# Patient Record
Sex: FEMALE | Race: OTHER | URBAN - METROPOLITAN AREA
[De-identification: names, ages, dates, MRNs, and addresses within clinical notes are randomized per-mention and may not be internally consistent; named-entity substitution may affect disease eponyms.]

---

## 2017-02-01 ENCOUNTER — EMERGENCY (EMERGENCY)
Facility: HOSPITAL | Age: 27
LOS: 1 days | Discharge: HOME | End: 2017-02-01
Attending: EMERGENCY MEDICINE
Payer: COMMERCIAL

## 2017-02-01 VITALS
TEMPERATURE: 98 F | SYSTOLIC BLOOD PRESSURE: 120 MMHG | DIASTOLIC BLOOD PRESSURE: 81 MMHG | HEIGHT: 62 IN | OXYGEN SATURATION: 99 % | WEIGHT: 138.89 LBS | RESPIRATION RATE: 18 BRPM | HEART RATE: 98 BPM

## 2017-02-01 DIAGNOSIS — S09.90XA UNSPECIFIED INJURY OF HEAD, INITIAL ENCOUNTER: ICD-10-CM
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
## 2017-02-01 PROCEDURE — 70450 CT HEAD/BRAIN W/O DYE: CPT | Mod: 26

## 2017-02-01 PROCEDURE — 99284 EMERGENCY DEPT VISIT MOD MDM: CPT

## 2017-02-01 PROCEDURE — 70450 CT HEAD/BRAIN W/O DYE: CPT

## 2017-02-01 NOTE — ED PROVIDER NOTE - OBJECTIVE STATEMENT
26 y/o F pt w/ no significant PMHx presents to the ED c/o headache. Pt states that 2 days ago she was accidentally elbowed in the forehead. Since has been feeling headache, nausea, lightheadedness and difficulty concentrating. Pt called her PMD who told her to come to the ED. Denies LOC, vomiting, difficulty walking or any other complaints. NKDA.

## 2017-02-01 NOTE — ED PROVIDER NOTE - MEDICAL DECISION MAKING DETAILS
Pt w/ nausea headache and dizziness after head injury. Normal CT head. Likely concussion. Pt to follow up w/ her PMD in 1-2 days and neurologist in 1 week. Pt w/ nausea, headache, and dizziness after head injury. Normal CT head. Likely concussion. Pt to follow up w/ her PMD in 1-2 days and neurologist in 1 week.

## 2017-06-17 ENCOUNTER — EMERGENCY (EMERGENCY)
Facility: HOSPITAL | Age: 27
LOS: 1 days | Discharge: HOME | End: 2017-06-17
Attending: EMERGENCY MEDICINE
Payer: COMMERCIAL

## 2017-06-17 VITALS
DIASTOLIC BLOOD PRESSURE: 73 MMHG | HEART RATE: 97 BPM | OXYGEN SATURATION: 100 % | TEMPERATURE: 98 F | WEIGHT: 138.01 LBS | SYSTOLIC BLOOD PRESSURE: 128 MMHG | RESPIRATION RATE: 20 BRPM | HEIGHT: 62 IN

## 2017-06-17 DIAGNOSIS — N30.90 CYSTITIS, UNSPECIFIED WITHOUT HEMATURIA: ICD-10-CM

## 2017-06-17 DIAGNOSIS — G43.909 MIGRAINE, UNSPECIFIED, NOT INTRACTABLE, WITHOUT STATUS MIGRAINOSUS: ICD-10-CM

## 2017-06-17 DIAGNOSIS — E03.9 HYPOTHYROIDISM, UNSPECIFIED: ICD-10-CM

## 2017-06-17 LAB
ANION GAP SERPL CALC-SCNC: 9 MMOL/L — SIGNIFICANT CHANGE UP (ref 5–17)
APPEARANCE UR: CLEAR — SIGNIFICANT CHANGE UP
BASOPHILS # BLD AUTO: 0.1 K/UL — SIGNIFICANT CHANGE UP (ref 0–0.2)
BASOPHILS NFR BLD AUTO: 1 % — SIGNIFICANT CHANGE UP (ref 0–2)
BILIRUB UR-MCNC: NEGATIVE — SIGNIFICANT CHANGE UP
BUN SERPL-MCNC: 8 MG/DL — SIGNIFICANT CHANGE UP (ref 7–18)
CALCIUM SERPL-MCNC: 8.4 MG/DL — SIGNIFICANT CHANGE UP (ref 8.4–10.5)
CHLORIDE SERPL-SCNC: 103 MMOL/L — SIGNIFICANT CHANGE UP (ref 96–108)
CO2 SERPL-SCNC: 26 MMOL/L — SIGNIFICANT CHANGE UP (ref 22–31)
COLOR SPEC: YELLOW — SIGNIFICANT CHANGE UP
CREAT SERPL-MCNC: 0.8 MG/DL — SIGNIFICANT CHANGE UP (ref 0.5–1.3)
DIFF PNL FLD: ABNORMAL
EOSINOPHIL # BLD AUTO: 0 K/UL — SIGNIFICANT CHANGE UP (ref 0–0.5)
EOSINOPHIL NFR BLD AUTO: 0.4 % — SIGNIFICANT CHANGE UP (ref 0–6)
GLUCOSE SERPL-MCNC: 129 MG/DL — HIGH (ref 70–99)
GLUCOSE UR QL: NEGATIVE — SIGNIFICANT CHANGE UP
HCG UR QL: NEGATIVE — SIGNIFICANT CHANGE UP
HCT VFR BLD CALC: 42.2 % — SIGNIFICANT CHANGE UP (ref 34.5–45)
HGB BLD-MCNC: 14.1 G/DL — SIGNIFICANT CHANGE UP (ref 11.5–15.5)
KETONES UR-MCNC: ABNORMAL
LEUKOCYTE ESTERASE UR-ACNC: ABNORMAL
LYMPHOCYTES # BLD AUTO: 1.1 K/UL — SIGNIFICANT CHANGE UP (ref 1–3.3)
LYMPHOCYTES # BLD AUTO: 14.8 % — SIGNIFICANT CHANGE UP (ref 13–44)
MCHC RBC-ENTMCNC: 29.7 PG — SIGNIFICANT CHANGE UP (ref 27–34)
MCHC RBC-ENTMCNC: 33.4 GM/DL — SIGNIFICANT CHANGE UP (ref 32–36)
MCV RBC AUTO: 89.1 FL — SIGNIFICANT CHANGE UP (ref 80–100)
MONOCYTES # BLD AUTO: 0.8 K/UL — SIGNIFICANT CHANGE UP (ref 0–0.9)
MONOCYTES NFR BLD AUTO: 11.2 % — SIGNIFICANT CHANGE UP (ref 2–14)
NEUTROPHILS # BLD AUTO: 5.2 K/UL — SIGNIFICANT CHANGE UP (ref 1.8–7.4)
NEUTROPHILS NFR BLD AUTO: 72.6 % — SIGNIFICANT CHANGE UP (ref 43–77)
NITRITE UR-MCNC: NEGATIVE — SIGNIFICANT CHANGE UP
PH UR: 5 — SIGNIFICANT CHANGE UP (ref 5–8)
PLATELET # BLD AUTO: 183 K/UL — SIGNIFICANT CHANGE UP (ref 150–400)
POTASSIUM SERPL-MCNC: 3.3 MMOL/L — LOW (ref 3.5–5.3)
POTASSIUM SERPL-SCNC: 3.3 MMOL/L — LOW (ref 3.5–5.3)
PROT UR-MCNC: NEGATIVE — SIGNIFICANT CHANGE UP
RBC # BLD: 4.73 M/UL — SIGNIFICANT CHANGE UP (ref 3.8–5.2)
RBC # FLD: 12.3 % — SIGNIFICANT CHANGE UP (ref 10.3–14.5)
SODIUM SERPL-SCNC: 138 MMOL/L — SIGNIFICANT CHANGE UP (ref 135–145)
SP GR SPEC: 1.01 — SIGNIFICANT CHANGE UP (ref 1.01–1.02)
UROBILINOGEN FLD QL: NEGATIVE — SIGNIFICANT CHANGE UP
WBC # BLD: 7.2 K/UL — SIGNIFICANT CHANGE UP (ref 3.8–10.5)
WBC # FLD AUTO: 7.2 K/UL — SIGNIFICANT CHANGE UP (ref 3.8–10.5)

## 2017-06-17 PROCEDURE — 81001 URINALYSIS AUTO W/SCOPE: CPT

## 2017-06-17 PROCEDURE — 96374 THER/PROPH/DIAG INJ IV PUSH: CPT

## 2017-06-17 PROCEDURE — 81025 URINE PREGNANCY TEST: CPT

## 2017-06-17 PROCEDURE — 96375 TX/PRO/DX INJ NEW DRUG ADDON: CPT

## 2017-06-17 PROCEDURE — 99284 EMERGENCY DEPT VISIT MOD MDM: CPT | Mod: 25

## 2017-06-17 PROCEDURE — 87086 URINE CULTURE/COLONY COUNT: CPT

## 2017-06-17 PROCEDURE — 99284 EMERGENCY DEPT VISIT MOD MDM: CPT

## 2017-06-17 PROCEDURE — 80048 BASIC METABOLIC PNL TOTAL CA: CPT

## 2017-06-17 PROCEDURE — 85027 COMPLETE CBC AUTOMATED: CPT

## 2017-06-17 RX ORDER — METOCLOPRAMIDE HCL 10 MG
10 TABLET ORAL ONCE
Refills: 0 | Status: COMPLETED | OUTPATIENT
Start: 2017-06-17 | End: 2017-06-17

## 2017-06-17 RX ORDER — DIPHENHYDRAMINE HCL 50 MG
25 CAPSULE ORAL ONCE
Refills: 0 | Status: COMPLETED | OUTPATIENT
Start: 2017-06-17 | End: 2017-06-17

## 2017-06-17 RX ORDER — SODIUM CHLORIDE 9 MG/ML
2000 INJECTION INTRAMUSCULAR; INTRAVENOUS; SUBCUTANEOUS ONCE
Refills: 0 | Status: COMPLETED | OUTPATIENT
Start: 2017-06-17 | End: 2017-06-17

## 2017-06-17 RX ORDER — POTASSIUM CHLORIDE 20 MEQ
40 PACKET (EA) ORAL ONCE
Refills: 0 | Status: COMPLETED | OUTPATIENT
Start: 2017-06-17 | End: 2017-06-17

## 2017-06-17 RX ADMIN — SODIUM CHLORIDE 2000 MILLILITER(S): 9 INJECTION INTRAMUSCULAR; INTRAVENOUS; SUBCUTANEOUS at 18:52

## 2017-06-17 RX ADMIN — Medication 1 TABLET(S): at 19:58

## 2017-06-17 RX ADMIN — Medication 40 MILLIEQUIVALENT(S): at 19:58

## 2017-06-17 RX ADMIN — Medication 25 MILLIGRAM(S): at 18:52

## 2017-06-17 RX ADMIN — Medication 10 MILLIGRAM(S): at 18:52

## 2017-06-17 NOTE — ED PROVIDER NOTE - CHPI ED SYMPTOMS NEG
no neck stiffness, no vomiting, no diarrhea, no numbness, no tingling, no weakness, no visual changes

## 2017-06-17 NOTE — ED PROVIDER NOTE - OBJECTIVE STATEMENT
28 y/o F pt with PMHx of migraines, hypothyroidism, presents to ED c/o migraine x 1 week. Pt describes HA to be a constant bitemporal nagging pain. Pt endorses nausea but denies vomiting. Pt reports having fevers at home (T-max 102). Pt denies vomiting, diarrhea, numbness, tingling, weakness, neck stiffness, visual changes, recent foreign travels, recent sick contacts, or any other complaints. NKDA.

## 2017-06-17 NOTE — ED PROVIDER NOTE - MEDICAL DECISION MAKING DETAILS
28 y/o F with H/O migraines, here for migraines and reported fever at home. Pt is well appearing and with vital signs WNL. Plan for labs, pain meds and reassess. Pt may require LP.

## 2017-06-18 LAB
CULTURE RESULTS: NO GROWTH — SIGNIFICANT CHANGE UP
SPECIMEN SOURCE: SIGNIFICANT CHANGE UP

## 2018-11-14 ENCOUNTER — EMERGENCY (EMERGENCY)
Facility: HOSPITAL | Age: 28
LOS: 1 days | Discharge: HOME | End: 2018-11-14
Attending: EMERGENCY MEDICINE
Payer: COMMERCIAL

## 2018-11-14 VITALS
DIASTOLIC BLOOD PRESSURE: 73 MMHG | WEIGHT: 132.06 LBS | OXYGEN SATURATION: 100 % | TEMPERATURE: 98 F | HEART RATE: 87 BPM | SYSTOLIC BLOOD PRESSURE: 108 MMHG | HEIGHT: 62 IN | RESPIRATION RATE: 16 BRPM

## 2018-11-14 VITALS
OXYGEN SATURATION: 100 % | DIASTOLIC BLOOD PRESSURE: 54 MMHG | TEMPERATURE: 98 F | SYSTOLIC BLOOD PRESSURE: 105 MMHG | HEART RATE: 69 BPM | RESPIRATION RATE: 20 BRPM

## 2018-11-14 PROCEDURE — 70450 CT HEAD/BRAIN W/O DYE: CPT | Mod: 26

## 2018-11-14 PROCEDURE — 96374 THER/PROPH/DIAG INJ IV PUSH: CPT

## 2018-11-14 PROCEDURE — 99284 EMERGENCY DEPT VISIT MOD MDM: CPT | Mod: 25

## 2018-11-14 PROCEDURE — 96375 TX/PRO/DX INJ NEW DRUG ADDON: CPT

## 2018-11-14 PROCEDURE — 99285 EMERGENCY DEPT VISIT HI MDM: CPT

## 2018-11-14 PROCEDURE — 70450 CT HEAD/BRAIN W/O DYE: CPT

## 2018-11-14 RX ORDER — MECLIZINE HCL 12.5 MG
25 TABLET ORAL ONCE
Refills: 0 | Status: COMPLETED | OUTPATIENT
Start: 2018-11-14 | End: 2018-11-14

## 2018-11-14 RX ORDER — SODIUM CHLORIDE 9 MG/ML
1000 INJECTION INTRAMUSCULAR; INTRAVENOUS; SUBCUTANEOUS ONCE
Refills: 0 | Status: COMPLETED | OUTPATIENT
Start: 2018-11-14 | End: 2018-11-14

## 2018-11-14 RX ORDER — METOCLOPRAMIDE HCL 10 MG
10 TABLET ORAL ONCE
Refills: 0 | Status: COMPLETED | OUTPATIENT
Start: 2018-11-14 | End: 2018-11-14

## 2018-11-14 RX ORDER — ONDANSETRON 8 MG/1
4 TABLET, FILM COATED ORAL ONCE
Refills: 0 | Status: COMPLETED | OUTPATIENT
Start: 2018-11-14 | End: 2018-11-14

## 2018-11-14 RX ORDER — ONDANSETRON 8 MG/1
1 TABLET, FILM COATED ORAL
Qty: 9 | Refills: 0
Start: 2018-11-14 | End: 2018-11-16

## 2018-11-14 RX ORDER — MECLIZINE HCL 12.5 MG
1 TABLET ORAL
Qty: 21 | Refills: 0
Start: 2018-11-14 | End: 2018-11-20

## 2018-11-14 RX ADMIN — ONDANSETRON 4 MILLIGRAM(S): 8 TABLET, FILM COATED ORAL at 11:38

## 2018-11-14 RX ADMIN — Medication 25 MILLIGRAM(S): at 11:38

## 2018-11-14 RX ADMIN — Medication 10 MILLIGRAM(S): at 14:25

## 2018-11-14 RX ADMIN — SODIUM CHLORIDE 1000 MILLILITER(S): 9 INJECTION INTRAMUSCULAR; INTRAVENOUS; SUBCUTANEOUS at 14:08

## 2018-11-14 RX ADMIN — SODIUM CHLORIDE 1000 MILLILITER(S): 9 INJECTION INTRAMUSCULAR; INTRAVENOUS; SUBCUTANEOUS at 11:38

## 2018-11-14 NOTE — ED PROVIDER NOTE - OBJECTIVE STATEMENT
29 y/o F pt c/o headache and dizziness s/p fall, she hit the R temporal region of her head yesterday. She is also c/o naus but denies vomiting, visual changes, weakness or any other symptoms at this time.

## 2018-11-14 NOTE — ED ADULT NURSE NOTE - NSIMPLEMENTINTERV_GEN_ALL_ED
Implemented All Fall Risk Interventions:  [place] to call system. Call bell, personal items and telephone within reach. Instruct patient to call for assistance. Room bathroom lighting operational. Non-slip footwear when patient is off stretcher. Physically safe environment: no spills, clutter or unnecessary equipment. Stretcher in lowest position, wheels locked, appropriate side rails in place. Provide visual cue, wrist band, yellow gown, etc. Monitor gait and stability. Monitor for mental status changes and reorient to person, place, and time. Review medications for side effects contributing to fall risk. Reinforce activity limits and safety measures with patient and family.

## 2018-11-14 NOTE — ED PROVIDER NOTE - PHYSICAL EXAMINATION
able to ambulate but when she turns her head she is dizzy and wobbly  PERRL  strength equal b/l  tenderness to palpation temporal region of head

## 2020-03-23 NOTE — ED ADULT NURSE NOTE - PSYCHOSOCIAL WDL
Please see why she would like to come in?  We could probably do a video visit.  Thanks.   Alert and oriented x 3, normal mood and affect, no apparent risk to self or others.

## 2021-01-23 ENCOUNTER — EMERGENCY (EMERGENCY)
Facility: HOSPITAL | Age: 31
LOS: 1 days | Discharge: HOME | End: 2021-01-23
Attending: EMERGENCY MEDICINE | Admitting: EMERGENCY MEDICINE
Payer: COMMERCIAL

## 2021-01-23 VITALS
TEMPERATURE: 98 F | SYSTOLIC BLOOD PRESSURE: 124 MMHG | HEART RATE: 94 BPM | OXYGEN SATURATION: 100 % | DIASTOLIC BLOOD PRESSURE: 81 MMHG | HEIGHT: 62 IN | RESPIRATION RATE: 16 BRPM

## 2021-01-23 LAB
ALBUMIN SERPL ELPH-MCNC: 4.5 G/DL — SIGNIFICANT CHANGE UP (ref 3.3–5)
ALP SERPL-CCNC: 95 U/L — SIGNIFICANT CHANGE UP (ref 40–120)
ALT FLD-CCNC: 15 U/L — SIGNIFICANT CHANGE UP (ref 4–33)
ANION GAP SERPL CALC-SCNC: 12 MMOL/L — SIGNIFICANT CHANGE UP (ref 7–14)
APPEARANCE UR: CLEAR — SIGNIFICANT CHANGE UP
AST SERPL-CCNC: 16 U/L — SIGNIFICANT CHANGE UP (ref 4–32)
BASOPHILS # BLD AUTO: 0.08 K/UL — SIGNIFICANT CHANGE UP (ref 0–0.2)
BASOPHILS NFR BLD AUTO: 0.6 % — SIGNIFICANT CHANGE UP (ref 0–2)
BILIRUB SERPL-MCNC: <0.2 MG/DL — SIGNIFICANT CHANGE UP (ref 0.2–1.2)
BILIRUB UR-MCNC: NEGATIVE — SIGNIFICANT CHANGE UP
BUN SERPL-MCNC: 8 MG/DL — SIGNIFICANT CHANGE UP (ref 7–23)
CALCIUM SERPL-MCNC: 9 MG/DL — SIGNIFICANT CHANGE UP (ref 8.4–10.5)
CHLORIDE SERPL-SCNC: 105 MMOL/L — SIGNIFICANT CHANGE UP (ref 98–107)
CO2 SERPL-SCNC: 23 MMOL/L — SIGNIFICANT CHANGE UP (ref 22–31)
COLOR SPEC: SIGNIFICANT CHANGE UP
CREAT SERPL-MCNC: 0.56 MG/DL — SIGNIFICANT CHANGE UP (ref 0.5–1.3)
DIFF PNL FLD: NEGATIVE — SIGNIFICANT CHANGE UP
EOSINOPHIL # BLD AUTO: 0.21 K/UL — SIGNIFICANT CHANGE UP (ref 0–0.5)
EOSINOPHIL NFR BLD AUTO: 1.5 % — SIGNIFICANT CHANGE UP (ref 0–6)
GLUCOSE SERPL-MCNC: 97 MG/DL — SIGNIFICANT CHANGE UP (ref 70–99)
GLUCOSE UR QL: NEGATIVE — SIGNIFICANT CHANGE UP
HCT VFR BLD CALC: 40.5 % — SIGNIFICANT CHANGE UP (ref 34.5–45)
HGB BLD-MCNC: 13.1 G/DL — SIGNIFICANT CHANGE UP (ref 11.5–15.5)
IANC: 9.6 K/UL — HIGH (ref 1.5–8.5)
IMM GRANULOCYTES NFR BLD AUTO: 0.4 % — SIGNIFICANT CHANGE UP (ref 0–1.5)
KETONES UR-MCNC: NEGATIVE — SIGNIFICANT CHANGE UP
LEUKOCYTE ESTERASE UR-ACNC: NEGATIVE — SIGNIFICANT CHANGE UP
LYMPHOCYTES # BLD AUTO: 2.91 K/UL — SIGNIFICANT CHANGE UP (ref 1–3.3)
LYMPHOCYTES # BLD AUTO: 21.2 % — SIGNIFICANT CHANGE UP (ref 13–44)
MCHC RBC-ENTMCNC: 29 PG — SIGNIFICANT CHANGE UP (ref 27–34)
MCHC RBC-ENTMCNC: 32.3 GM/DL — SIGNIFICANT CHANGE UP (ref 32–36)
MCV RBC AUTO: 89.6 FL — SIGNIFICANT CHANGE UP (ref 80–100)
MONOCYTES # BLD AUTO: 0.88 K/UL — SIGNIFICANT CHANGE UP (ref 0–0.9)
MONOCYTES NFR BLD AUTO: 6.4 % — SIGNIFICANT CHANGE UP (ref 2–14)
NEUTROPHILS # BLD AUTO: 9.6 K/UL — HIGH (ref 1.8–7.4)
NEUTROPHILS NFR BLD AUTO: 69.9 % — SIGNIFICANT CHANGE UP (ref 43–77)
NITRITE UR-MCNC: NEGATIVE — SIGNIFICANT CHANGE UP
NRBC # BLD: 0 /100 WBCS — SIGNIFICANT CHANGE UP
NRBC # FLD: 0 K/UL — SIGNIFICANT CHANGE UP
PH UR: 8 — SIGNIFICANT CHANGE UP (ref 5–8)
PLATELET # BLD AUTO: 315 K/UL — SIGNIFICANT CHANGE UP (ref 150–400)
POTASSIUM SERPL-MCNC: 4.1 MMOL/L — SIGNIFICANT CHANGE UP (ref 3.5–5.3)
POTASSIUM SERPL-SCNC: 4.1 MMOL/L — SIGNIFICANT CHANGE UP (ref 3.5–5.3)
PROT SERPL-MCNC: 6.8 G/DL — SIGNIFICANT CHANGE UP (ref 6–8.3)
PROT UR-MCNC: NEGATIVE — SIGNIFICANT CHANGE UP
RBC # BLD: 4.52 M/UL — SIGNIFICANT CHANGE UP (ref 3.8–5.2)
RBC # FLD: 12.6 % — SIGNIFICANT CHANGE UP (ref 10.3–14.5)
SODIUM SERPL-SCNC: 140 MMOL/L — SIGNIFICANT CHANGE UP (ref 135–145)
SP GR SPEC: 1.01 — SIGNIFICANT CHANGE UP (ref 1.01–1.02)
UROBILINOGEN FLD QL: SIGNIFICANT CHANGE UP
WBC # BLD: 13.74 K/UL — HIGH (ref 3.8–10.5)
WBC # FLD AUTO: 13.74 K/UL — HIGH (ref 3.8–10.5)

## 2021-01-23 PROCEDURE — 76830 TRANSVAGINAL US NON-OB: CPT | Mod: 26

## 2021-01-23 PROCEDURE — 76770 US EXAM ABDO BACK WALL COMP: CPT | Mod: 26

## 2021-01-23 PROCEDURE — 99285 EMERGENCY DEPT VISIT HI MDM: CPT

## 2021-01-23 RX ORDER — DIPHENHYDRAMINE HCL 50 MG
25 CAPSULE ORAL ONCE
Refills: 0 | Status: DISCONTINUED | OUTPATIENT
Start: 2021-01-23 | End: 2021-01-23

## 2021-01-23 RX ORDER — SODIUM CHLORIDE 9 MG/ML
1000 INJECTION INTRAMUSCULAR; INTRAVENOUS; SUBCUTANEOUS ONCE
Refills: 0 | Status: COMPLETED | OUTPATIENT
Start: 2021-01-23 | End: 2021-01-23

## 2021-01-23 RX ORDER — MORPHINE SULFATE 50 MG/1
4 CAPSULE, EXTENDED RELEASE ORAL ONCE
Refills: 0 | Status: DISCONTINUED | OUTPATIENT
Start: 2021-01-23 | End: 2021-01-23

## 2021-01-23 RX ORDER — DIPHENHYDRAMINE HCL 50 MG
50 CAPSULE ORAL ONCE
Refills: 0 | Status: COMPLETED | OUTPATIENT
Start: 2021-01-23 | End: 2021-01-23

## 2021-01-23 RX ORDER — ACETAMINOPHEN 500 MG
975 TABLET ORAL ONCE
Refills: 0 | Status: COMPLETED | OUTPATIENT
Start: 2021-01-23 | End: 2021-01-23

## 2021-01-23 RX ORDER — FAMOTIDINE 10 MG/ML
20 INJECTION INTRAVENOUS ONCE
Refills: 0 | Status: COMPLETED | OUTPATIENT
Start: 2021-01-23 | End: 2021-01-23

## 2021-01-23 RX ADMIN — Medication 975 MILLIGRAM(S): at 21:50

## 2021-01-23 RX ADMIN — FAMOTIDINE 20 MILLIGRAM(S): 10 INJECTION INTRAVENOUS at 21:43

## 2021-01-23 RX ADMIN — MORPHINE SULFATE 4 MILLIGRAM(S): 50 CAPSULE, EXTENDED RELEASE ORAL at 20:54

## 2021-01-23 RX ADMIN — SODIUM CHLORIDE 1000 MILLILITER(S): 9 INJECTION INTRAMUSCULAR; INTRAVENOUS; SUBCUTANEOUS at 21:15

## 2021-01-23 RX ADMIN — Medication 50 MILLIGRAM(S): at 21:40

## 2021-01-23 RX ADMIN — Medication 125 MILLIGRAM(S): at 21:40

## 2021-01-23 NOTE — ED ADULT NURSE NOTE - OBJECTIVE STATEMENT
Pt received in INT13. C/o lower abd pain since yesterday, worse on exertion. Reports one episode of diarrhea today. Denies nausea or vomiting. Denies urinary symptoms. LMP last week. A&Ox4, ambulatory. Breathing even and unlabored. No complaints of chest pain, headache, dizziness, SOB, fever, or chills. 20G placed on left AC. Labs drawn and sent. Will reassess.

## 2021-01-23 NOTE — ED PROVIDER NOTE - NS ED ROS FT
Gen: No fever, chills, night sweats. Decreased appetite  Eyes: No changes in vision   ENT: No congestion, sore throat  Resp: No cough or trouble breathing  Cardiovascular: No chest pain or palpitation  Gastroenteric: No nausea, vomiting, diarrhea or constipation  :  No change in urine output, dysuria or hematuria   MS: No joint or muscle pain  Skin: No rashes, lacerations, wounds or abrasions   Neuro: No headache; no abnormal movements

## 2021-01-23 NOTE — ED PROVIDER NOTE - PROGRESS NOTE DETAILS
Resident: Adriana Baez - Patient felt like her throat was "closing up" after 1mL of morphine was given. Morphine was stopped. Patient was given 50mg IV benadryl, 125 mg solumedrol and 20mg pepcid. Reported feeling back to her baseline shortly after. DAFNE Mendosa: pt signed out to Dr Carson, follow up CT results.  Pt resting comfortably NAD. Dr. Brandon Carson DO (ED ATTENDING): CT abdomen and other results with no acute abnormalities. lower lung nodular densities explained to patient and given printed results for f/u

## 2021-01-23 NOTE — ED PROVIDER NOTE - NSFOLLOWUPINSTRUCTIONS_ED_ALL_ED_FT
As per radiology, you have two nodules at the base of your lung which should be followed in 6 months with a repeat CT. Please bring your results to your primary care physician.   You may follow up with your OB for your fibroids. Take motrin/tylenol for pain control. You were seen in the Emergency Room and evaluated for any life-threatening conditions.     After our evaluation, we have determined you can be discharged to go home.  Please return to the Emergency Room if your symptoms change or worsen    As per radiology, you have two nodules at the base of your lung which should be followed in 6 months with a repeat CT. Please bring your results to your primary care physician.   You may follow up with your OB for your fibroids. Take motrin/tylenol for pain control.

## 2021-01-23 NOTE — ED PROVIDER NOTE - PATIENT PORTAL LINK FT
You can access the FollowMyHealth Patient Portal offered by [facility] by registering at the following website: [email or web address]. By joining [employer]’s FollowMyHealth portal, you will also be able to view your health information using other applications (apps) compatible with our system.

## 2021-01-23 NOTE — ED PROVIDER NOTE - CARE PLAN
Principal Discharge DX:	Fibroid uterus   Principal Discharge DX:	Fibroid uterus  Secondary Diagnosis:	Abdominal pain

## 2021-01-23 NOTE — ED ADULT TRIAGE NOTE - HEIGHT IN INCHES
Monitor placed by Karolina Jamil RN on 7/21/2020.  The patient was instructed and understands monitor use and event transmission procedure.    Does the patient have a pacemaker?  No    ACT Monitor serial #: [id number]    Time to be monitored: 7 days    
Monitor printed and given to Dr. Woods for review.  
2

## 2021-01-23 NOTE — ED PROVIDER NOTE - FAMILY HISTORY
Mother  Still living? Unknown  Family history of diabetes mellitus, Age at diagnosis: Age Unknown  Family history of leukemia, Age at diagnosis: Age Unknown

## 2021-01-23 NOTE — ED PROVIDER NOTE - OBJECTIVE STATEMENT
32yo F with PMHx ovarian cyst (uncertain which side) p/w RLQ abd pain onset yesterday. Pain is persistent and constant in nature, rated to be 8/10 in intensity. Has not taken any pain medications for relief of sxs. No nausea or vomiting or diarrhea. Did feel like she could not pass gas yesterday, but has since passed gas today. Had a BM, NB today. No vaginal bleeding. Recently stopped taking OCPs. Trying to be pregnant. Not on any hormonal/IVF therapy. Does endorse h/o renal stones "a very very long time ago." No hematuria or dysuria. No trauma or abuse. No prior surgeries.

## 2021-01-23 NOTE — ED PROVIDER NOTE - PHYSICAL EXAMINATION
CONSTITUTIONAL: NAD. Awake and conversive, cooperative with exam, looks uncomfortable secondary to pain   EYES: EOMI, conjunctiva and sclera clear  ENMT: MMM   NECK: Supple   RESPIRATORY: Normal respiratory effort, CTAB, no wheezes, rales, or rhonchi  CARDIOVASCULAR: RRR, normal S1 and S2, no MRG, distal pulses 2+ bilaterally, capillary refill < 2 seconds, no peripheral edema  ABDOMEN: Soft, non-distended, RLQ TTP, patient sitting very oddly on exam table secondary to the pain, no rebound/guarding  MUSCULOSKELETAL: No joint swelling or tenderness  NEURO: Following commands, moving all extremities spontaneously  PSYCH: appropriate affect   SKIN: No rashes; no palpable lesions

## 2021-01-23 NOTE — ED ADULT NURSE REASSESSMENT NOTE - NS ED NURSE REASSESS COMMENT FT1
Pt reported numbness/tingling on left extremity, and "itchiness" on throat after half dose (0.5mL) of ordered morphine. Denies SOB, pt able to speak in complete sentences. A&Ox4, NAD. Vitally stable. MD notified. Pt medicated for suspected adverse reaction. Will reassess.

## 2021-01-24 VITALS
OXYGEN SATURATION: 100 % | TEMPERATURE: 98 F | SYSTOLIC BLOOD PRESSURE: 113 MMHG | HEART RATE: 68 BPM | RESPIRATION RATE: 18 BRPM | DIASTOLIC BLOOD PRESSURE: 78 MMHG

## 2021-01-24 PROCEDURE — 74177 CT ABD & PELVIS W/CONTRAST: CPT | Mod: 26

## 2021-04-20 ENCOUNTER — INPATIENT (INPATIENT)
Facility: HOSPITAL | Age: 31
LOS: 0 days | Discharge: HOME | DRG: 817 | End: 2021-04-21
Attending: OBSTETRICS & GYNECOLOGY | Admitting: OBSTETRICS & GYNECOLOGY
Payer: COMMERCIAL

## 2021-04-20 VITALS
WEIGHT: 134.92 LBS | SYSTOLIC BLOOD PRESSURE: 128 MMHG | HEART RATE: 79 BPM | OXYGEN SATURATION: 99 % | DIASTOLIC BLOOD PRESSURE: 81 MMHG | TEMPERATURE: 98 F | RESPIRATION RATE: 16 BRPM | HEIGHT: 62 IN

## 2021-04-20 DIAGNOSIS — O00.201 RIGHT OVARIAN PREGNANCY WITHOUT INTRAUTERINE PREGNANCY: ICD-10-CM

## 2021-04-20 LAB
ALBUMIN SERPL ELPH-MCNC: 4.1 G/DL — SIGNIFICANT CHANGE UP (ref 3.3–5)
ALP SERPL-CCNC: 95 U/L — SIGNIFICANT CHANGE UP (ref 40–120)
ALT FLD-CCNC: 17 U/L — SIGNIFICANT CHANGE UP (ref 12–78)
ANION GAP SERPL CALC-SCNC: 7 MMOL/L — SIGNIFICANT CHANGE UP (ref 5–17)
APTT BLD: 36.2 SEC — HIGH (ref 27.5–35.5)
AST SERPL-CCNC: 13 U/L — LOW (ref 15–37)
BASOPHILS # BLD AUTO: 0.05 K/UL — SIGNIFICANT CHANGE UP (ref 0–0.2)
BASOPHILS NFR BLD AUTO: 0.4 % — SIGNIFICANT CHANGE UP (ref 0–2)
BILIRUB SERPL-MCNC: 0.4 MG/DL — SIGNIFICANT CHANGE UP (ref 0.2–1.2)
BLD GP AB SCN SERPL QL: SIGNIFICANT CHANGE UP
BUN SERPL-MCNC: 8 MG/DL — SIGNIFICANT CHANGE UP (ref 7–23)
CALCIUM SERPL-MCNC: 8.2 MG/DL — LOW (ref 8.5–10.1)
CHLORIDE SERPL-SCNC: 108 MMOL/L — SIGNIFICANT CHANGE UP (ref 96–108)
CO2 SERPL-SCNC: 26 MMOL/L — SIGNIFICANT CHANGE UP (ref 22–31)
CREAT SERPL-MCNC: 0.69 MG/DL — SIGNIFICANT CHANGE UP (ref 0.5–1.3)
EOSINOPHIL # BLD AUTO: 0.17 K/UL — SIGNIFICANT CHANGE UP (ref 0–0.5)
EOSINOPHIL NFR BLD AUTO: 1.5 % — SIGNIFICANT CHANGE UP (ref 0–6)
GLUCOSE SERPL-MCNC: 88 MG/DL — SIGNIFICANT CHANGE UP (ref 70–99)
HCG SERPL-ACNC: 175 MIU/ML — HIGH
HCT VFR BLD CALC: 38.1 % — SIGNIFICANT CHANGE UP (ref 34.5–45)
HGB BLD-MCNC: 12.7 G/DL — SIGNIFICANT CHANGE UP (ref 11.5–15.5)
IMM GRANULOCYTES NFR BLD AUTO: 0.5 % — SIGNIFICANT CHANGE UP (ref 0–1.5)
INR BLD: 1.12 RATIO — SIGNIFICANT CHANGE UP (ref 0.88–1.16)
LYMPHOCYTES # BLD AUTO: 2.76 K/UL — SIGNIFICANT CHANGE UP (ref 1–3.3)
LYMPHOCYTES # BLD AUTO: 24.6 % — SIGNIFICANT CHANGE UP (ref 13–44)
MCHC RBC-ENTMCNC: 30.2 PG — SIGNIFICANT CHANGE UP (ref 27–34)
MCHC RBC-ENTMCNC: 33.3 GM/DL — SIGNIFICANT CHANGE UP (ref 32–36)
MCV RBC AUTO: 90.5 FL — SIGNIFICANT CHANGE UP (ref 80–100)
MONOCYTES # BLD AUTO: 0.64 K/UL — SIGNIFICANT CHANGE UP (ref 0–0.9)
MONOCYTES NFR BLD AUTO: 5.7 % — SIGNIFICANT CHANGE UP (ref 2–14)
NEUTROPHILS # BLD AUTO: 7.54 K/UL — HIGH (ref 1.8–7.4)
NEUTROPHILS NFR BLD AUTO: 67.3 % — SIGNIFICANT CHANGE UP (ref 43–77)
NRBC # BLD: 0 /100 WBCS — SIGNIFICANT CHANGE UP (ref 0–0)
PLATELET # BLD AUTO: 305 K/UL — SIGNIFICANT CHANGE UP (ref 150–400)
POTASSIUM SERPL-MCNC: 3.6 MMOL/L — SIGNIFICANT CHANGE UP (ref 3.5–5.3)
POTASSIUM SERPL-SCNC: 3.6 MMOL/L — SIGNIFICANT CHANGE UP (ref 3.5–5.3)
PROT SERPL-MCNC: 7.8 G/DL — SIGNIFICANT CHANGE UP (ref 6–8.3)
PROTHROM AB SERPL-ACNC: 13 SEC — SIGNIFICANT CHANGE UP (ref 10.6–13.6)
RBC # BLD: 4.21 M/UL — SIGNIFICANT CHANGE UP (ref 3.8–5.2)
RBC # FLD: 12.6 % — SIGNIFICANT CHANGE UP (ref 10.3–14.5)
SODIUM SERPL-SCNC: 141 MMOL/L — SIGNIFICANT CHANGE UP (ref 135–145)
WBC # BLD: 11.22 K/UL — HIGH (ref 3.8–10.5)
WBC # FLD AUTO: 11.22 K/UL — HIGH (ref 3.8–10.5)

## 2021-04-20 PROCEDURE — 76830 TRANSVAGINAL US NON-OB: CPT | Mod: 26

## 2021-04-20 PROCEDURE — 99285 EMERGENCY DEPT VISIT HI MDM: CPT

## 2021-04-20 PROCEDURE — 88305 TISSUE EXAM BY PATHOLOGIST: CPT | Mod: 26

## 2021-04-20 RX ORDER — SODIUM CHLORIDE 9 MG/ML
1000 INJECTION INTRAMUSCULAR; INTRAVENOUS; SUBCUTANEOUS ONCE
Refills: 0 | Status: COMPLETED | OUTPATIENT
Start: 2021-04-20 | End: 2021-04-20

## 2021-04-20 RX ADMIN — SODIUM CHLORIDE 1000 MILLILITER(S): 9 INJECTION INTRAMUSCULAR; INTRAVENOUS; SUBCUTANEOUS at 23:00

## 2021-04-20 RX ADMIN — SODIUM CHLORIDE 1000 MILLILITER(S): 9 INJECTION INTRAMUSCULAR; INTRAVENOUS; SUBCUTANEOUS at 20:55

## 2021-04-20 NOTE — CONSULT NOTE ADULT - ASSESSMENT
A/P: 31y Female  with highly suspicious ruptured right tubal ectopic pregnancy  Rh pos   R/B/A explained. Proceed with Diagnostic laparoscopy, right salpingectomy, evacuation of hemoperitoneum.  Consented  Follow up post op at the office in 2 weeks.

## 2021-04-20 NOTE — ED PROVIDER NOTE - OBJECTIVE STATEMENT
31 F with vaginal bleeding for ~ 20 days and now with right abdominal pain, seen at outpatient OB and found to be pregnant with right adnexal mass and concern for ectopic. patient told to come here by Dr. Lynch for possible operation. patient report 7/10 RLQ pain. last ate solid food this morning last drank at 5pm.

## 2021-04-20 NOTE — ED ADULT NURSE NOTE - CHIEF COMPLAINT QUOTE
Patient was seen at [facility] in [place] coordinated with Dr. Lynch; had 2 pregnancy test and ultrasound found to have ectopic pregnancy an hour ago complaining of right lower abdominal pain has been having vaginal bleed since April 3, 2021; as stated first pregnancy

## 2021-04-20 NOTE — ED ADULT NURSE REASSESSMENT NOTE - NS ED NURSE REASSESS COMMENT FT1
Report received from ELAN PORTILLO.  Patient is pending COVID swab result and disposition to the OR.

## 2021-04-20 NOTE — ED PROVIDER NOTE - PHYSICAL EXAMINATION
Vital signs as available reviewed.  General:  Comfortable, no acute distress.  Head:  Normocephalic, atraumatic.  Eyes:  Conjunctiva pink, no icterus.  Cardiovascular:  Regular rate, no obvious murmur.  Respiratory:  Clear to auscultation, good air entry bilaterally.  Abdomen:  Soft, + RLQ tenderness to palpation.  Musculoskeletal:  No deformity or calf tenderness.  Neurologic: Alert and oriented, moving all extremities.  Skin:  Warm and dry.

## 2021-04-20 NOTE — ED ADULT TRIAGE NOTE - CHIEF COMPLAINT QUOTE
Patient was seen at [facility] in [place] coordinated with Dr. Lynch; had 2 pregnancy test and ultrasound found to have ectopic pregnancy an hour ago complaining of right lower abdominal pain has been having vaginal bleed since April 3, 2021; as stated first pregnancy

## 2021-04-20 NOTE — ED PROVIDER NOTE - NS ED ROS FT
Constitutional: No fever.  Neurological: No headache.  Eyes: No vision changes.   Ears, Nose, Mouth, Throat: No congestion.  Cardiovascular: No chest pain.  Respiratory: No difficulty breathing.  Gastrointestinal: +nausea. No vomiting. + RLQ pain.  Genitourinary: No dysuria. + vaginal bleeding.  Musculoskeletal: No joint pain.  Integumentary (skin and/or breast): No rash.

## 2021-04-20 NOTE — CONSULT NOTE ADULT - SUBJECTIVE AND OBJECTIVE BOX
Cc: vaginal bleeding and pelvic pain     HPI: 31y Female  Last Menstrual Period 4/3/2021,  3/5/2021 sent from the office for evaluation of suspected ectopic pregnancy. Patient has been trying to conceive for the past 6 months. Has been bleeding since LMP with some cramping. Yesterday, pelvic pain intensified. Pelvic US at the office showed a Rt adnexal mass with free fluid.     Past History:     Ob Hx: primipara    Gyn Hx: 12/reg/5. +hx myomas. +hx ovarian cysts. Denies STIs. Uptodate with Gyn check.    PMH: Thyroid nodule, benign   Migraine    PSH: Denies     Medications: pnv    Allergies:   morphine (Anaphylaxis)  sulfa drugs (Rash)    Social Hx: Denies toxic habits x 3    FAMILY HISTORY:  Family history of diabetes mellitus (Mother)  Family history of leukemia (Mother)        Review of Systems:   Constitutional: No fever/chills  Respiratory: No Shortness of breath/Chest pain  Cardiac: No Palpitations  : No frequency/dysuria/vaginal discharge, +vaginal bleeding      Physical exam:   Vital Signs Last 24 Hrs  T(F): 97.6 (2021 20:30), Max: 97.6 (2021 20:30)  HR: 79 (2021 20:30) (79 - 79)  BP: 128/81 (2021 20:30) (128/81 - 128/81)  RR: 16 (2021 20:30) (16 - 16)  SpO2: 99% (2021 20:30) (99% - 99%)  Gen: AAO x 3  Abd: Soft, +tender in lower quadrants, no rebound or guarding  VE: deferred to OR    LABS:                        12.7   11.22 )-----------( 305      ( 2021 21:03 )             38.1     04-20    141  |  108  |  8   ----------------------------<  88  3.6   |  26  |  0.69    Ca    8.2<L>      2021 21:03    TPro  7.8  /  Alb  4.1  /  TBili  0.4  /  DBili  x   /  AST  13<L>  /  ALT  17  /  AlkPhos  95  04-20    PT/INR - ( 2021 21:03 )   PT: 13.0 sec;   INR: 1.12 ratio         PTT - ( 2021 21:03 )  PTT:36.2 sec    HCG Quantitative, Serum: 175 mIU/mL ( @ 21:03)        RADIOLOGY & ADDITIONAL STUDIES:    < from: US Transvaginal (21 @ 22:10) >    EXAM:  US TRANSVAGINAL                            PROCEDURE DATE:  2021          INTERPRETATION:  PELVIC ULTRASOUND    INDICATION: Pelvic pain. Vaginal bleeding.    TECHNIQUE: Real-time grayscale, color, and spectral Doppler of the female pelvis was performed. Transabdominal and endovaginal scanning was performed.    COMPARISON: Pelvic ultrasound 2021.    FINDINGS:    Uterus: Multiple intrauterine fibroids identified. No intrauterine gestational pregnancy identified. Endometrial stripe measures 5 mm.    Ovaries:  The right ovary measures 2.2 x 1.7 x 1.4 cm containing corpus luteum cyst measuring up to 1.2 cm. Normal vascularity is present. There is a right adnexal mass measuring 2.7 x 2.1 x 2.1 cm with increased peripheral vascularity, appears separate from right ovary.    The left ovary measures 3 x 1.7 x 1.7 cm. Normal vascularity is present. No adnexal masses.    There is moderate complex pelvic free fluid.    IMPRESSION:    No intrauterine gestational pregnancy identified. Right adnexal mass 2.7 x 2.1 x 2.1 cm with increased peripheral vascularity, appears separate from right ovary, concerning for ectopic pregnancy. Moderate complex pelvic free fluid. Recommend clinical correlation to assess for ruptured right adnexalectopic pregnancy. OB/GYN consultation and follow-up imaging is advised.    These critical results were discussed via telephone at 2021 10:23 PM by Dr. Dunham of radiology with Dr. Miller, institution read-back verification policy was followed.            BELGICA DUNHAM MD; Attending Radiologist  This document has been electronically signed. 2021 10:25PM    < end of copied text >

## 2021-04-20 NOTE — ED PROVIDER NOTE - PROGRESS NOTE DETAILS
Case discussed with Dr. Lynch, she requests pre-op labs and US, she would like to be called when US imaging available.

## 2021-04-20 NOTE — ED ADULT NURSE NOTE - OBJECTIVE STATEMENT
received pt in  bed #11a Pt A&O "I was [facility].... I started bleeding on april 3rd it got heavy last 2 days & I got really bad abd pain last night.....I had us they said there was a uterus mass & blood...they think its an ectopic pregnancy"

## 2021-04-21 VITALS
HEART RATE: 78 BPM | SYSTOLIC BLOOD PRESSURE: 110 MMHG | OXYGEN SATURATION: 99 % | DIASTOLIC BLOOD PRESSURE: 60 MMHG | RESPIRATION RATE: 16 BRPM

## 2021-04-21 LAB — SARS-COV-2 RNA SPEC QL NAA+PROBE: SIGNIFICANT CHANGE UP

## 2021-04-21 PROCEDURE — 36415 COLL VENOUS BLD VENIPUNCTURE: CPT

## 2021-04-21 PROCEDURE — 99285 EMERGENCY DEPT VISIT HI MDM: CPT

## 2021-04-21 PROCEDURE — 88305 TISSUE EXAM BY PATHOLOGIST: CPT

## 2021-04-21 PROCEDURE — 85610 PROTHROMBIN TIME: CPT

## 2021-04-21 PROCEDURE — 86901 BLOOD TYPING SEROLOGIC RH(D): CPT

## 2021-04-21 PROCEDURE — 85730 THROMBOPLASTIN TIME PARTIAL: CPT

## 2021-04-21 PROCEDURE — 85025 COMPLETE CBC W/AUTO DIFF WBC: CPT

## 2021-04-21 PROCEDURE — 86900 BLOOD TYPING SEROLOGIC ABO: CPT

## 2021-04-21 PROCEDURE — 76830 TRANSVAGINAL US NON-OB: CPT

## 2021-04-21 PROCEDURE — U0003: CPT

## 2021-04-21 PROCEDURE — U0005: CPT

## 2021-04-21 PROCEDURE — 86850 RBC ANTIBODY SCREEN: CPT

## 2021-04-21 PROCEDURE — 84702 CHORIONIC GONADOTROPIN TEST: CPT

## 2021-04-21 PROCEDURE — 96361 HYDRATE IV INFUSION ADD-ON: CPT

## 2021-04-21 PROCEDURE — 96360 HYDRATION IV INFUSION INIT: CPT

## 2021-04-21 PROCEDURE — 59151 TREAT ECTOPIC PREGNANCY: CPT | Mod: AS

## 2021-04-21 PROCEDURE — 80053 COMPREHEN METABOLIC PANEL: CPT

## 2021-04-21 RX ORDER — ACETAMINOPHEN 500 MG
1000 TABLET ORAL ONCE
Refills: 0 | Status: COMPLETED | OUTPATIENT
Start: 2021-04-21 | End: 2021-04-21

## 2021-04-21 RX ORDER — HYDROMORPHONE HYDROCHLORIDE 2 MG/ML
0.5 INJECTION INTRAMUSCULAR; INTRAVENOUS; SUBCUTANEOUS
Refills: 0 | Status: DISCONTINUED | OUTPATIENT
Start: 2021-04-21 | End: 2021-04-21

## 2021-04-21 RX ORDER — SODIUM CHLORIDE 9 MG/ML
1000 INJECTION, SOLUTION INTRAVENOUS
Refills: 0 | Status: DISCONTINUED | OUTPATIENT
Start: 2021-04-21 | End: 2021-04-21

## 2021-04-21 RX ADMIN — Medication 400 MILLIGRAM(S): at 01:35

## 2021-04-21 RX ADMIN — SODIUM CHLORIDE 100 MILLILITER(S): 9 INJECTION, SOLUTION INTRAVENOUS at 01:35

## 2021-04-21 NOTE — ASU DISCHARGE PLAN (ADULT/PEDIATRIC) - ASU DC SPECIAL INSTRUCTIONSFT
No heavy lifting, no strenuous activity, no tub baths, no gym or sports until cleared by Dr. Canales. Follow up in office in 2 weeks. Take over the counter Ibuprofen and acetaminophen as needed for pain. Call office if experiencing severe pain.

## 2021-04-21 NOTE — ASU DISCHARGE PLAN (ADULT/PEDIATRIC) - CARE PROVIDER_API CALL
Irais Huntley  OBSTETRICS AND GYNECOLOGY  [address] [facility] [address] [facility], [address] [place] [address]  Phone: [phone]  Fax: [phone]  Follow Up Time:

## 2021-04-21 NOTE — BRIEF OPERATIVE NOTE - OPERATION/FINDINGS
EUA: 8-10 wk sized bulky uterus. Laparoscopy: 50 cc of hemoperitoneum. Myomatous uterus. Normal appearing left tube. Normal appearing ovaries. Rt tubal ectopic. Endometriosis implants on anterior and posterior cul-de-sac.

## 2021-04-21 NOTE — ED ADULT NURSE REASSESSMENT NOTE - NS ED NURSE REASSESS COMMENT FT1
Patient transported to the OR by OR staff member with  at bedside. Patient transported to the OR by OR staff member without notifying ED RN or ED staff.   at bedside.

## 2021-04-21 NOTE — BRIEF OPERATIVE NOTE - NSICDXBRIEFPROCEDURE_GEN_ALL_CORE_FT
The patient is a 7y Female complaining of PROCEDURES:  Laparoscopic right salpingectomy for ectopic pregnancy 21-Apr-2021 01:32:28  Irais Canales

## 2024-03-14 RX ORDER — ACETAMINOPHEN 500 MG
2 TABLET ORAL
Qty: 0 | Refills: 0 | DISCHARGE

## 2024-03-14 RX ORDER — IBUPROFEN 200 MG
1 TABLET ORAL
Qty: 0 | Refills: 0 | DISCHARGE

## 2024-12-20 NOTE — ED PROVIDER NOTE - CHPI ED SYMPTOMS NEG
[employer] [employer].   Care instructions adapted under license by [employer]. If you have questions about a medical condition or this instruction, always ask your healthcare professional. [employer] disclaims any warranty or liability for your use of this information.           Eating Healthy Foods: Care Instructions  With every meal, you can make healthy food choices. Try to eat a variety of fruits, vegetables, 
no LOC, no vomiting

## 2025-06-17 NOTE — ED PROVIDER NOTE - DATE/TIME 3
Introduction Text (Please End With A Colon): The following procedure was deferred: X Size Of Lesion In Cm (Optional): 0 Detail Level: Detailed Instructions (Optional): Patient refused treatment. Elected to observe lesion for changes. Counseled patient on risks for not treating an invasive squamous cell carcinoma, chances of spreading, maatasitisizing, and even death. Patient still elects to observe at this time. Informed refusal signed. 24-Jan-2021 04:10